# Patient Record
(demographics unavailable — no encounter records)

---

## 2025-03-25 NOTE — HISTORY OF PRESENT ILLNESS
[FreeTextEntry1] : Ms. BRITTANY SWIFT, 73 year old female, never smoker, w/ hx of CAD w/stent 2011, Breast cancer s/p L Masectomy 2015, HTN, GERD, who presented to the ED 7/2021 w/ with chest pain and dizziness; Troponin within normal limits. Workup CT demonstrating mediastinal mass.   CT Chest, Abdo, Pelvis w/IVC 7/16/21 - Soft tissue density mass in the superior mediastinum measures 3.4 x 2.2 cm, in retrospect is unchanged from prior CT dated 6/22/2015. - Status post left mastectomy. - 1.1 cm indeterminant posterior left renal lesion is unchanged since 2015. - Grade 1 anterolisthesis of L5 on S1. Left rotator cuff repair. Partially imaged anterior cervical spinal hardware.  CT Chest w/ contrast on 8/3/22: - increased in size 4cm circumscribed homogeneous ovoid mass posterior and medial to the left brachiocephalic vein, and anterior to the left common carotid and brachiocephalic artery origin, prior 3.5 cm. - Small hiatal hernia.  MRI Chest on 8/16/22 (Compared to 2019 CT Chest): - 3 cm superior/anterior mediastinal mass, not significantly changed in size since 2019, which is therefore benign. Signal characteristics are compatible with a mildly complex cyst which most likely arises from thymus.   CT Chest on 2/14/23: - Anterior mediastinal soft tissue attenuating mass measuring up to 3.8 cm (2, 25), which is unchanged from prior when it measured 4.0 cm when measured in similar fashion. - Coronary artery calcifications; Aortic calcifications. - Calcified CONSUELO granuloma. - Small hiatal hernia - Cervical spinal fusion hardware. Degenerative changes. Left breast postsurgical changes.  CT Chest on 2/23/24:  - A calcified granuloma is again seen within the CONSUELO.  - Again noted within the anterior/superior mediastinum is a 3.2 AP x 2.6 TR x 2.8 CC cm mass. This is juxtaposed to the right innominate artery and left brachiocephalic vein. This was remeasured on prior exam measuring 3.1 x 2.5 x 2.8 cm and is without significant change however has increased in size from 6/22/2015.  - Scattered atherosclerotic calcifications of the thoracic aorta and upper abdominal aorta.  - Coronary artery Atherosclerotic calcifications and or stents again seen. No pericardial effusion. - Left breast postsurgical changes from breast reconstructive surgery. - Small hiatal hernia.   CT Chest on 3/10/25:  I- Unchanged CONSUELO calcified granuloma. Subsegmental atelectasis of the RLL. No pleural effusion. - Anterior mediastinal mass measuring 3.6 x 2.7 x 2.8 cm (AP x TR x CC), grossly unchanged and prior exam when measured in a similar fashion. This mass is in close proximity to the brachiocephalic trunk, left common carotid artery, and left subclavian vein.  - Small hiatal hernia. - Coronary artery calcifications and stents. Aortic calcifications. - Small hyperdense lesion in the posterior upper pole pole left kidney which may represent a proteinaceous cyst. - s/p left mastectomy with surgical changes the left chest wall. Surgical clips in the left axilla. Right humeral head hardware. Partially visualized anterior cervical fusion hardware.  - Degenerative changes of the thoracic spine.  Patient presents today for follow up. Overall, she reports to be feeling well. Denies any chest pain, shortness of breath, cough, or hemoptysis.

## 2025-03-25 NOTE — ASSESSMENT
[FreeTextEntry1] : Ms. BRITTANY SWIFT, 73 year old female, never smoker, w/ hx of CAD w/stent 2011, Breast cancer s/p L Masectomy 2015, HTN, GERD, who presented to the ED 7/2021 w/ with chest pain and dizziness; Troponin within normal limits. Workup CT demonstrating mediastinal mass. Currently under active surveillance.   Here today with follow up scan.   I have independently reviewed the patient's medical records and diagnostic images at time of this office consultation and have made the following recommendation: - CT Chest reviewed with patient, stable anterior mediastinal mass. Discussed resection of mass for tissue diagnosis vs conservative management. She opted for surveillance, will repeat CT Chest without contrast in 1 year to re-assess stability. She is agreeable.   Recommendations reviewed with patient during this office visit, and all questions answered; Patient instructed on the importance of follow up and verbalizes understanding.   I, JAQUELINE DingIM, personally performed the evaluation and management (E/M) services for this established patient. That E/M includes conducting the examination, assessing all new/exacerbated conditions, and establishing a new plan of care. Today, my ACP, Calderon Batres NP, was here to observe my evaluation and management services for this new problem/exacerbated condition to be followed going forward.

## 2025-03-25 NOTE — CONSULT LETTER
[Courtesy Letter:] : I had the pleasure of seeing your patient, [unfilled], in my office today. [Please see my note below.] : Please see my note below. [Consult Closing:] : Thank you very much for allowing me to participate in the care of this patient.  If you have any questions, please do not hesitate to contact me. [Sincerely,] : Sincerely, [FreeTextEntry2] : Dr. Chaz Laura (PCP)\par  Dr. Avilez (Cards)\par  ED referral [FreeTextEntry3] : \par  \par  \par  Harry Beyer MD, FACS \par  Chief, Division of Thoracic Surgery \par  Director, Minimally Invasive Thoracic Surgery \par  Department of Cardiovascular and Thoracic Surgery \par  [facility] \par  , Cardiovascular and Thoracic Surgery

## 2025-03-25 NOTE — CONSULT LETTER
[Courtesy Letter:] : I had the pleasure of seeing your patient, [unfilled], in my office today. [Please see my note below.] : Please see my note below. [Consult Closing:] : Thank you very much for allowing me to participate in the care of this patient.  If you have any questions, please do not hesitate to contact me. [Sincerely,] : Sincerely, [FreeTextEntry2] : Dr. Chaz Laura (PCP)\par  Dr. Avilez (Cards)\par  ED referral [FreeTextEntry3] : \par  \par  \par  Harry Beyer MD, FACS \par  Chief, Division of Thoracic Surgery \par  Director, Minimally Invasive Thoracic Surgery \par  Department of Cardiovascular and Thoracic Surgery \par  [facility] \par  , Cardiovascular and Thoracic Surgery

## 2025-03-31 NOTE — REVIEW OF SYSTEMS
[Joint Pain] : joint pain [Neck Pain] : neck pain [Joint Stiffness] : joint stiffness [Lower Back Pain] : lower back pain [As Noted in HPI] : as noted in HPI [Sleep Disturbances] : sleep disturbances [Negative] : Heme/Lymph

## 2025-03-31 NOTE — PHYSICAL EXAM
[Oriented To Time, Place, And Person] : oriented to person, place, and time [Affect] : the affect was normal [Mood] : the mood was normal [Cranial Nerves Optic (II)] : visual acuity intact bilaterally,  visual fields full to confrontation, pupils equal round and reactive to light [Cranial Nerves Oculomotor (III)] : extraocular motion intact [Cranial Nerves Trigeminal (V)] : facial sensation intact symmetrically [Cranial Nerves Facial (VII)] : face symmetrical [Cranial Nerves Vestibulocochlear (VIII)] : hearing was intact bilaterally [Cranial Nerves Glossopharyngeal (IX)] : tongue and palate midline [Cranial Nerves Accessory (XI - Cranial And Spinal)] : head turning and shoulder shrug symmetric [Cranial Nerves Hypoglossal (XII)] : there was no tongue deviation with protrusion [Motor Tone] : muscle tone was normal in all four extremities [Motor Handedness Right-Handed] : the patient is right hand dominant [Tactile Decrease Lower Thigh (L3) - Right Only] : diminished right lower thigh (L3) [Tactile Decrease Knee And Medial Leg (L4) - Right Only] : diminished right knee and medial leg (L4) [Tactile Decrease Lateral Leg And Dorsum Of Foot - Right Only] : diminished right lateral leg and dorsum of the foot (L5) [2+] : Brachioradialis left 2+ [1+] : Patella left 1+ [0] : Ankle jerk left 0 [Neck Appearance] : the appearance of the neck was normal [Heart Rate And Rhythm] : heart rate was normal and rhythm regular [No CVA Tenderness] : no ~M costovertebral angle tenderness [Skin Color & Pigmentation] : normal skin color and pigmentation [Motor Strength Upper Extremities Bilaterally] : strength was normal in both upper extremities [Motor Strength Lower Extremities Bilaterally] : strength was normal in both lower extremities [Abnormal Walk] : normal gait [FreeTextEntry7] : decreased sensation PP/LT left side of body inlcuding face  [FreeTextEntry1] : diffuse C paraspinal muscle and trap tenderness associated with spasm

## 2025-03-31 NOTE — ASSESSMENT
[FreeTextEntry1] : 74 y/o F with multiple medical comorbidities with chronic pain. Today with new onset left ear pain associated with numbness and tingling left side of face. On exam decreased sensation PP/LT left side of body. B/L L>R cervical paraspinal muscles as well as trap with trigger points.  She has failed multiple conservative treatments.  Denies AH/VH, denies arrythmia.   Discussed in detail the nature of chronic pain as well as treatment options including nonopioid pain management PT, therapeutic massage, stretching, exercise program, acupuncture, CBT as well as topical medications, non-opioid pain medications, Trigger point injections.   MRI of head with and w/put contrast, MRA head w/out to r/o structural pathology inclduing vasc compression.  - Continue PT  -Continue current meds -continue topicals.  -Will consider medical marijuana 1: caution given eliquis . She will discuss with cardiology and will need cardio clearance. Will consider after imaging.  [email or web address] [phone]  1:1 -consider TPI    The patient had the opportunity to ask questions and all were answered to their satisfaction.  The patient verbalized understanding of the management plan and agreed with our recommendations.

## 2025-03-31 NOTE — HISTORY OF PRESENT ILLNESS
[FreeTextEntry1] : BRITTANY SWIFT is a 73 year female with a Pmhx of HTN, HLD, GERD, CAD s/p stent, breast CA s/p L mastectomy, mediastinal mass, cervical stenosis s/p ACDF C6-7, Lumbar spondylosis with radiculopathy with chronic pain referred for eval of chronic neck and back pain. Approximately 3 months ago developed pain in left ear sharp 8/10 constant radiating to left side of neck and left shoulder associated with muscle tightness, new onset numbness and tingling left cheek x3 weeks. She has seen ENT and PCP. Recent ER visit CT head and temporal bones reported as nml. Started on gabapentin 300mg 2x/day which helps somewhat.   Lower back pain chronic for many years and has progressively worsened over time.  B/L LBP L >R intermittent but daily sharp or electric pain 8/10 on average radiating down B/L LE to feet. Pain increased with activity, prolonged sitting or standing, alleviated slightly with topicals, heating pad. PT prior no help and is actively getting PT now.  Requesting MM certification.   Allergies: PCN Prior medications: Pregabalin, cymbalta. Current medications : Lisinopril, Carvedilol, Zolpidem 5mg, melatonin 10 mg, gabapentin Eliquis, pravastatin,   Social Hx : She is  and lives with her  in [facility].  She does not smoke, vape, drink and denies illicits

## 2025-03-31 NOTE — ASSESSMENT
[FreeTextEntry1] : 72 y/o F with multiple medical comorbidities with chronic pain. Today with new onset left ear pain associated with numbness and tingling left side of face. On exam decreased sensation PP/LT left side of body. B/L L>R cervical paraspinal muscles as well as trap with trigger points.  She has failed multiple conservative treatments.  Denies AH/VH, denies arrythmia.   Discussed in detail the nature of chronic pain as well as treatment options including nonopioid pain management PT, therapeutic massage, stretching, exercise program, acupuncture, CBT as well as topical medications, non-opioid pain medications, Trigger point injections.   MRI of head with and w/put contrast, MRA head w/out to r/o structural pathology inclduing vasc compression.  - Continue PT  -Continue current meds -continue topicals.  -Will consider medical marijuana 1: caution given eliquis . She will discuss with cardiology and will need cardio clearance. Will consider after imaging.  [email or web address] [phone]  1:1 -consider TPI    The patient had the opportunity to ask questions and all were answered to their satisfaction.  The patient verbalized understanding of the management plan and agreed with our recommendations.

## 2025-03-31 NOTE — DATA REVIEWED
[FreeTextEntry1] : INTERPRETATION:  CLINICAL INFORMATION: Left ear pain with mastoid tenderness. Evaluate for mastoiditis.  COMPARISON: CT head 12/6/2023.  CONTRAST: IV Contrast: Omnipaque 350  85 cc administered  15 cc discarded .  TECHNIQUE:  Serial axial images were obtained from the skull base to the vertex using multi-slice helical technique. Sagittal and coronal reformats were obtained. Thin axial images through the temporal bones were obtained.  Retro- reformatted high resolution images were obtained. Coronal and sagittal reformatted images were obtained.  FINDINGS:  CT HEAD:  VENTRICLES AND SULCI: Age appropriate involutional changes. INTRA-AXIAL: No mass effect, acute hemorrhage, or midline shift.  Mild extent periventricular and subcortical white matter hypodensities, consistent with microvascular type changes. EXTRA-AXIAL: No mass or fluid collection. Basal cisterns are normal in appearance.  VISUALIZED SINUSES:  Right maxillary sinus polyp versus mucus retention cyst. TYMPANOMASTOID CAVITIES:  Clear. No mastoid effusion. VISUALIZED ORBITS: Bilateral lens replacement. CALVARIUM: Intact.  MISCELLANEOUS: Intracranial atherosclerotic calcifications.  CT TEMPORAL BONES:  RIGHT EXTERNAL AUDITORY CANAL: Normal in caliber without significant abnormal soft tissue. The tympanic membrane is normal. RIGHT TYMPANOMASTOID CAVITY:  Well developed and fully aerated. The scutum is sharp. RIGHT OSSICULAR CHAIN:  Intact. RIGHT OTIC CAPSULE STRUCTURES:  There is normal cochlear segmentation. The modiolus is intact. The vestibular aqueduct and cochlear aqueduct are normal in size. The semicircular canals are intact. RIGHT VASCULAR CHANNELS:  The ICA and IJV are normal in appearance. RIGHT TEGMEN:  Intact. RIGHT FACIAL NERVE CANAL:  Intact. RIGHT INTERNAL AUDITORY CANAL:  Normal.  LEFT EXTERNAL AUDITORY CANAL: Normal in caliber without significant abnormal soft tissue.  The tympanic membrane is normal. LEFT TYMPANOMASTOID CAVITY:  Well developed and fully aerated. The scutum is sharp. LEFT OSSICULAR CHAIN:  Intact. LEFT OTIC CAPSULE STRUCTURES:  There is normal cochlear segmentation. The modiolus is intact. The vestibular aqueduct and cochlear aqueduct are normal in size. The semicircular canals are intact. LEFT VASCULAR CHANNELS:  The ICA and IJV are normal in appearance. LEFT TEGMEN:  Intact. LEFT FACIAL NERVE CANAL:  Intact. LEFT INTERNAL AUDITORY CANAL:  Normal.  MISCELLANEOUS:  None.  IMPRESSION:  CT head:  No acute intracranial hemorrhage, mass effect, or midline shift. No mastoid effusion.  CT temporal bones:  Unremarkable examination of the temporal bones.

## 2025-05-03 NOTE — SIGNATURES
[TextEntry] : This note was written by Kristal Espinal on 05/01/2025 acting as medical scribe for Dr. Ximena Negrete. I, Dr. Ximena Negrete, have read and attest that all the information, medical decision making and discharge instructions within are true and accurate.

## 2025-05-03 NOTE — REVIEW OF SYSTEMS
[Cough] : cough [Sputum] : sputum [Dyspnea] : dyspnea [Negative] : Endocrine [Wheezing] : no wheezing [TextBox_30] : productive cough of whitish phlegm.

## 2025-05-03 NOTE — PROCEDURE
[FreeTextEntry1] : Pulmonary Function Test performed today, 05/01/2025, in my office: Spirometry: Within normal limits. Lung Volume: Within normal limits. Diffusion: Within normal limits. ____________________________________________________________ Exhaled Nitric Oxide             Final  No Documents Attached    	Test  	Result  	Flag	Reference	Goal	Last Verified  	Exhaled Nitric Oxide	15	 	 		REQUIRED  Ordered by: REYNALDO EVANS       Collected/Examined: 01May2025 11:09AM       Verification Required       Stage: Final       Performed at: In Office       Performed by: REYNALDO EVANS       Resulted: 01May2025 11:09AM       Last Updated: 01May2025 11:09AM       ____________________________________________________________ Xray Chest 2 Views PA/Lat             Final  No Documents Attached    	 Chest x-ray PA and lateral views performed in my office today showed s/p surgical clips on left breast/cervical spine surgery, clear lungs, no evidence of infiltrates or pleural effusions.  Ordered by: REYNALDO EVANS       Collected/Examined: 01May2025 11:16AM       Verification Required       Stage: Final       Performed at: In Office       Performed by: REYNALDO EVANS       Resulted: 01May2025 11:16AM       Last Updated: 01May2025 11:17AM       ____________________________________________________________ CT Chest No Cont             Final  No Documents Attached    	 EXAM: [id number] - CT CHEST  - ORDERED BY: YVONNE OLSON   PROCEDURE DATE:  03/10/2025    INTERPRETATION:  CLINICAL INFORMATION: Follow-up anterior mediastinal mass.  COMPARISON: CT chest dated 2/23/2024.  CONTRAST/COMPLICATIONS: IV Contrast: NONE Oral Contrast: NONE .  PROCEDURE: CT scan of the chest was obtained without intravenous contrast.  FINDINGS:  AIRWAYS/LUNGS/PLEURA: Patent central airways. Unchanged left upper lobe calcified granuloma. Subsegmental atelectasis of the right lower lobe. No pleural effusion.  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 This mass is in close proximity to the brachiocephalic trunk, left common carotid artery, and left subclavian vein. No lymphadenopathy. Small hiatal hernia.  VESSELS: Coronary artery calcifications and stents. Aortic calcifications.  HEART: Heart size is normal. No pericardial effusion.  VISUALIZED UPPER ABDOMEN: There is a small hyperdense lesion in the posterior upper pole pole left kidney which may represent a proteinaceous cyst..  CHEST WALL AND BONES: Status post left mastectomy with surgical changes the left chest wall. Surgical clips in the left axilla. Right humeral head hardware. Partially visualized anterior cervical fusion hardware. Degenerative changes of the thoracic spine.  IMPRESSION:  Grossly unchanged 3.6 x 2.7 x 2.8 cm anterior mediastinal mass.    --- End of Report ---      RAMBO BLANCO DO; Resident Radiologist This document has been electronically signed. BANDAR MORALES MD; Attending Radiologist This document has been electronically signed. Mar 14 2025 11:35AM  Ordered by: YVONNE OLSON       Collected/Examined: 10Mar2025 11:53AM       Verification Required       Stage: Final       Performed at: [facility]       Resulted: 14Mar2025 10:20AM       Last Updated: 14Mar2025 11:39AM       Accession: [id number]

## 2025-05-03 NOTE — PHYSICAL EXAM
[No Acute Distress] : no acute distress [Normal Oropharynx] : normal oropharynx [Normal Appearance] : normal appearance [No Neck Mass] : no neck mass [Normal Rate/Rhythm] : normal rate/rhythm [Normal S1, S2] : normal s1, s2 [No Murmurs] : no murmurs [No Resp Distress] : no resp distress [Wheeze] : wheeze [No Abnormalities] : no abnormalities [Benign] : benign [Normal Gait] : normal gait [No Clubbing] : no clubbing [No Cyanosis] : no cyanosis [No Edema] : no edema [FROM] : FROM [Normal Color/ Pigmentation] : normal color/ pigmentation [No Focal Deficits] : no focal deficits [Oriented x3] : oriented x3 [Normal Affect] : normal affect [TextBox_68] : mild bilateral expiratory wheeze

## 2025-05-03 NOTE — HISTORY OF PRESENT ILLNESS
[TextBox_4] : BRITTANY SWIFT is a 73 year-old female who presents to the office today for initial pulmonary evaluation. Patient presents with regard to a history of a chronic productive cough. Brittany reports that she was working by the [employer] on 9/11 and since then she has had a productive cough with whitish phlegm. She also reports intermittent dyspnea, and states that she has a lot of chest pain for which she was seen by her cardiologist last week; she does not have chest pain at this time.

## 2025-05-03 NOTE — ASSESSMENT
[FreeTextEntry1] : Dr. Negrete discussed with BRITTANY the five potential causes of a cough: postnasal drip, GERD, asthma, bronchitis, or bronchiectasis.  Obtained and reviewed pulmonary function test and NiOx results with patient today. PFT and NiOx were both unremarkable.  Chest x-ray PA and lateral views performed in my office today showed s/p surgical clips on left breast/cervical spine surgery, clear lungs, no evidence of infiltrates or pleural effusions.  Dr. Negrete reviewed with BRITTANY her chest CT scan dated 03/10/2025, ordered by Dr. Beyer, in office today. The study shows grossly unchanged 3.6 x 2.7 x 2.8 cm anterior mediastinal mass. (Brittany reports that she is being observed clinically per Dr. Beyer and will undergo repeat chest CT scan in 1 year).   Dr. Negrete's recommendation: Brittany's cough is likely secondary to postnasal drip/sinusitis.   Dr. Negrete's recommendation: At this point, BRITTANY should start taking Doxycycline and Prednisone taper for her cough/inflammation/sinusitis. BRITTANY should also start taking Fluticasone nasal spray, two sprays in each nostril once daily, for her cough/postnasal drip; she has been advised to blow her nose before administering the medication. Advised Brittany to follow up with Dr. Beyer for continued monitoring of her mediastinal mass. Return for pulmonary follow up 2 weeks.

## 2025-05-03 NOTE — PROCEDURE
Well-controlled on Wellbutrin  Continue same  We will continue to monitor  [FreeTextEntry1] : Pulmonary Function Test performed today, 05/01/2025, in my office: Spirometry: Within normal limits. Lung Volume: Within normal limits. Diffusion: Within normal limits. ____________________________________________________________ Exhaled Nitric Oxide             Final  No Documents Attached    	Test  	Result  	Flag	Reference	Goal	Last Verified  	Exhaled Nitric Oxide	15	 	 		REQUIRED  Ordered by: REYNALOD EVANS       Collected/Examined: 01May2025 11:09AM       Verification Required       Stage: Final       Performed at: In Office       Performed by: REYNALDO EVANS       Resulted: 01May2025 11:09AM       Last Updated: 01May2025 11:09AM       ____________________________________________________________ Xray Chest 2 Views PA/Lat             Final  No Documents Attached    	 Chest x-ray PA and lateral views performed in my office today showed s/p surgical clips on left breast/cervical spine surgery, clear lungs, no evidence of infiltrates or pleural effusions.  Ordered by: REYNALDO EVANS       Collected/Examined: 01May2025 11:16AM       Verification Required       Stage: Final       Performed at: In Office       Performed by: REYNALDO EVANS       Resulted: 01May2025 11:16AM       Last Updated: 01May2025 11:17AM       ____________________________________________________________ CT Chest No Cont             Final  No Documents Attached    	 EXAM: [id number] - CT CHEST  - ORDERED BY: YVONNE OLSON   PROCEDURE DATE:  03/10/2025    INTERPRETATION:  CLINICAL INFORMATION: Follow-up anterior mediastinal mass.  COMPARISON: CT chest dated 2/23/2024.  CONTRAST/COMPLICATIONS: IV Contrast: NONE Oral Contrast: NONE .  PROCEDURE: CT scan of the chest was obtained without intravenous contrast.  FINDINGS:  AIRWAYS/LUNGS/PLEURA: Patent central airways. Unchanged left upper lobe calcified granuloma. Subsegmental atelectasis of the right lower lobe. No pleural effusion.  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 This mass is in close proximity to the brachiocephalic trunk, left common carotid artery, and left subclavian vein. No lymphadenopathy. Small hiatal hernia.  VESSELS: Coronary artery calcifications and stents. Aortic calcifications.  HEART: Heart size is normal. No pericardial effusion.  VISUALIZED UPPER ABDOMEN: There is a small hyperdense lesion in the posterior upper pole pole left kidney which may represent a proteinaceous cyst..  CHEST WALL AND BONES: Status post left mastectomy with surgical changes the left chest wall. Surgical clips in the left axilla. Right humeral head hardware. Partially visualized anterior cervical fusion hardware. Degenerative changes of the thoracic spine.  IMPRESSION:  Grossly unchanged 3.6 x 2.7 x 2.8 cm anterior mediastinal mass.    --- End of Report ---      RAMBO BLANCO DO; Resident Radiologist This document has been electronically signed. BANDAR MORALES MD; Attending Radiologist This document has been electronically signed. Mar 14 2025 11:35AM  Ordered by: YVONNE OLSON       Collected/Examined: 10Mar2025 11:53AM       Verification Required       Stage: Final       Performed at: [facility]       Resulted: 14Mar2025 10:20AM       Last Updated: 14Mar2025 11:39AM       Accession: [id number]

## 2025-05-03 NOTE — HISTORY OF PRESENT ILLNESS
[TextBox_4] : BRITTANY SWIFT is a 73 year-old female who presents to the office today for initial pulmonary evaluation. Patient presents with regard to a history of a chronic productive cough. Brittany reports that she was working by the [employer] on 9/11 and since then she has had a productive cough with whitish phlegm. She also reports intermittent dyspnea, and states that she has a lot of chest pain for which she was seen by her cardiologist last week; she does not have chest pain at this time.

## 2025-05-13 NOTE — ASSESSMENT
[FreeTextEntry1] : Dr. Negrete's recommendation: At this point, BRITTANY should reduce taking her Fluticasone nasal spray, to one spray in each nostril once daily, for her cough/postnasal drip; she has been advised to blow her nose before administering the medication. Return for pulmonary follow up 1 month.

## 2025-05-13 NOTE — SIGNATURES
[TextEntry] : This note was written by Anmol Skinner on 05/13/2025 acting as medical scribe for Dr. Ximena Negrete. I, Dr. Ximena Negrete, have read and attest that all the information, medical decision making and discharge instructions within are true and accurate.

## 2025-05-13 NOTE — HISTORY OF PRESENT ILLNESS
[TextBox_4] : BRITTANY SWIFT is a 73 year-old female who presents to the office today for a follow up pulmonary visit. Patient presents with regard to a history of a chronic productive cough. Brittany reports that she saw benefit from doxycycline and Prednisone taper for her productive cough with whitish phlegm. She denies any wheeze, dyspnea, or chest pain.

## 2025-07-07 NOTE — ASSESSMENT
[FreeTextEntry1] : 74 y/o F with multiple medical comorbidities with chronic pain. Today with new onset left ear pain associated with numbness and tingling left side of face. On exam decreased sensation PP/LT left side of body. B/L L>R cervical paraspinal muscles as well as trap with trigger points.  She has failed multiple conservative treatments.  Denies AH/VH, denies arrythmia.   Discussed in detail the nature of chronic pain as well as treatment options including nonopioid pain management PT, therapeutic massage, stretching, exercise program, acupuncture, CBT as well as topical medications, non-opioid pain medications, Trigger point injections.   -TPI left traps and left c paraspinal and left ONB performed today w/out AE MRI of head with and w/out contrast, MRA head w/out nml.  - restart PT  -Continue current meds -continue topicals.  -Will consider medical marijuana 1: caution given eliquis . She will discuss with cardiology and will need cardio clearance. Will consider after imaging.  [email or web address] [phone]  1:1 -Repeat TPI including Lumbar or ONB if needed    The patient had the opportunity to ask questions and all were answered to their satisfaction.  The patient verbalized understanding of the management plan and agreed with our recommendations.

## 2025-07-07 NOTE — PROCEDURE
[FreeTextEntry1] : The procedure risks, hazards and alternatives were discussed with the patient and appropriate consent was obtained. The patient's left occipital area was palpated to identify location of greater occipital nerve. Alcohol was applied topically to the skin. Using a 27 gauge needle (aspirating during insertion), 2.5 cc of a mixture of Kenalog mg, 1% lidocaine was injected on the LEFT side (directing needle to center, left and right of painful focus). Pressure with a gauze pad was held briefly upon the site of puncture to minimize bleeding and to further spread anaesthetic subcutaneously.  The patient tolerated procedure well without any apparent difficulties or complications.   The procedure risks, hazards and alternatives were discussed with the patient and appropriate consent was obtained. The area over the myofascial spasm / pain was prepped with alcohol utilizing sterile technique. After isolating it between two palpating fingertips a 27 gauge 1.5 needle was placed in the center of the myofascial spasm and a negative aspiration was performed. A total of 4 mL of 1% Lidocaine mixed with Kenalog 40 mg was injected into 4sites Left traps and left C paraspinals. The patient tolerated procedure well without any apparent difficulties or complications.   BRITTANY SWIFT was monitored in the office for 15 minutes after injections and tolerated procedure well without adverse events.

## 2025-07-07 NOTE — HISTORY OF PRESENT ILLNESS
[FreeTextEntry1] : BRITTANY SWIFT is a 73 year female with a Pmhx of HTN, HLD, GERD, CAD s/p stent, breast CA s/p L mastectomy, mediastinal mass, cervical stenosis s/p ACDF C6-7, Lumbar spondylosis with radiculopathy with chronic pain referred for eval of chronic neck and back pain.  In Jan 2025 developed pain in left ear sharp 8/10 constant radiating to left side of neck and left shoulder associated with muscle tightness, new onset numbness and tingling left cheek x3 weeks.  Numbness and tingling left cheek improved but pain has persisted.  Pain described as shocking pain 8/10 left ear to traps.  Lower back pain chronic for many years and has progressively worsened over time now constant B/L LBP R>L intermittent but daily sharp or electric pain 9-10 on average radiating down B/L LE to feet. Pain increased with activity, prolonged sitting or standing, alleviated slightly with topicals, heating pad.   She was getting PT which was helpful.  Taking gabapentin 300mg 2x/day which helps somewhat. APAP prn helps slightly   She has seen ENT and PCP. Recent ER visit CT head and temporal bones reported as nml.    Requesting MM certification.   Allergies: PCN Prior medications: Pregabalin, cymbalta. Dicyclomine  Current medications : Lisinopril, Carvedilol, Zolpidem 5mg, melatonin 10 mg, gabapentin 300mg 2x/day, Eliquis, pravastatin,   Social Hx : She is  and lives with her  in [facility].  She does not smoke, vape, drink and denies illicits

## 2025-07-07 NOTE — PHYSICAL EXAM
[Oriented To Time, Place, And Person] : oriented to person, place, and time [Affect] : the affect was normal [Mood] : the mood was normal [Cranial Nerves Optic (II)] : visual acuity intact bilaterally,  visual fields full to confrontation, pupils equal round and reactive to light [Cranial Nerves Oculomotor (III)] : extraocular motion intact [Cranial Nerves Trigeminal (V)] : facial sensation intact symmetrically [Cranial Nerves Facial (VII)] : face symmetrical [Cranial Nerves Vestibulocochlear (VIII)] : hearing was intact bilaterally [Cranial Nerves Glossopharyngeal (IX)] : tongue and palate midline [Cranial Nerves Accessory (XI - Cranial And Spinal)] : head turning and shoulder shrug symmetric [Cranial Nerves Hypoglossal (XII)] : there was no tongue deviation with protrusion [Motor Tone] : muscle tone was normal in all four extremities [Motor Handedness Right-Handed] : the patient is right hand dominant [Tactile Decrease Lower Thigh (L3) - Right Only] : diminished right lower thigh (L3) [Tactile Decrease Knee And Medial Leg (L4) - Right Only] : diminished right knee and medial leg (L4) [Tactile Decrease Lateral Leg And Dorsum Of Foot - Right Only] : diminished right lateral leg and dorsum of the foot (L5) [Abnormal Walk] : normal gait [2+] : Brachioradialis left 2+ [1+] : Patella left 1+ [0] : Ankle jerk left 0 [Neck Appearance] : the appearance of the neck was normal [Heart Rate And Rhythm] : heart rate was normal and rhythm regular [No CVA Tenderness] : no ~M costovertebral angle tenderness [Skin Color & Pigmentation] : normal skin color and pigmentation [Motor Strength Upper Extremities Bilaterally] : strength was normal in both upper extremities [Motor Strength Lower Extremities Bilaterally] : strength was normal in both lower extremities [FreeTextEntry7] : decreased sensation PP/LT left side of body inlcuding face  [FreeTextEntry1] : diffuse C paraspinal muscle and trap tenderness associated with spasm

## 2025-07-28 NOTE — ASSESSMENT
[FreeTextEntry1] : left ear discomfort: - audio with mild to mod HF SNHL - discussed HAE but she declines at this time - reviewed CT temporal bones and MRI brain both without clear etiology for left otalgia - discussed no obvious otologic source of pain and suspect may be nerve related or tension type pain related to her neck as she feels pulling and discomfort from back of neck to left head - consider neurology eval - f/u with ENT PRN

## 2025-07-28 NOTE — HISTORY OF PRESENT ILLNESS
[de-identified] : Left ear pain for 1 year now, saw different doctors and they can not find out what is the problem. she had left breast surgery for cancer and they removed her lymph nodes under her left arm in 2015. in 2012 she had posterior spine/neck surgery and they implanted titanium. so she thinks all this surgeries may be the reason she is having ear pain. she has no decreased hearing she noted. she has had imaging of her ears with CT temporal bones and MRI head.

## 2025-07-28 NOTE — HISTORY OF PRESENT ILLNESS
[de-identified] : Left ear pain for 1 year now, saw different doctors and they can not find out what is the problem. she had left breast surgery for cancer and they removed her lymph nodes under her left arm in 2015. in 2012 she had posterior spine/neck surgery and they implanted titanium. so she thinks all this surgeries may be the reason she is having ear pain. she has no decreased hearing she noted. she has had imaging of her ears with CT temporal bones and MRI head.